# Patient Record
Sex: FEMALE | Race: ASIAN | NOT HISPANIC OR LATINO | URBAN - METROPOLITAN AREA
[De-identification: names, ages, dates, MRNs, and addresses within clinical notes are randomized per-mention and may not be internally consistent; named-entity substitution may affect disease eponyms.]

---

## 2017-07-11 ENCOUNTER — APPOINTMENT (RX ONLY)
Dept: URBAN - METROPOLITAN AREA CLINIC 55 | Facility: CLINIC | Age: 37
Setting detail: DERMATOLOGY
End: 2017-07-11

## 2017-07-11 VITALS — HEIGHT: 64 IN | WEIGHT: 120 LBS

## 2017-07-11 DIAGNOSIS — M31.0 HYPERSENSITIVITY ANGIITIS: ICD-10-CM

## 2017-07-11 PROCEDURE — ? DIAGNOSIS COMMENT

## 2017-07-11 PROCEDURE — ? BIOPSY BY PUNCH METHOD

## 2017-07-11 PROCEDURE — ? COUNSELING

## 2017-07-11 ASSESSMENT — LOCATION SIMPLE DESCRIPTION DERM: LOCATION SIMPLE: RIGHT THIGH

## 2017-07-11 ASSESSMENT — LOCATION DETAILED DESCRIPTION DERM: LOCATION DETAILED: RIGHT ANTERIOR DISTAL THIGH

## 2017-07-11 ASSESSMENT — LOCATION ZONE DERM: LOCATION ZONE: LEG

## 2017-07-11 NOTE — PROCEDURE: BIOPSY BY PUNCH METHOD
Hemostasis: None
Size Of Lesion In Cm (Optional): 0
Wound Care: Aquaphor
Punch Size In Mm: 3
Dressing: pressure dressing with telfa
Render Post-Care Instructions In Note?: yes
Path Notes (To The Dermatopathologist): [facility]
Bill 18126 For Specimen Handling/Conveyance To Laboratory?: no
Consent: Oral consent was obtained and risks were reviewed including but not limited to scarring, infection, bleeding, scabbing, incomplete removal, nerve damage and allergy to anesthesia.
Suture Removal: 7 days
Detail Level: Detailed
Epidermal Sutures: 4-0 Ethilon
Anesthesia Type: 1% lidocaine without epinephrine
Billing Type: Third-Party Bill
Biopsy Type: H and E
Lab: [id number]
Anesthesia Volume In Cc (Will Not Render If 0): 1.2
Notification Instructions: Patient will be notified of biopsy results. However, patient instructed to call the office if not contacted within 2 weeks.
Post-Care Instructions: I reviewed with the patient in detail post-care instructions. Patient is to keep the biopsy site dry overnight, and then apply aquaphor daily until healed.
Lab Facility: [id number]

## 2017-07-28 ENCOUNTER — APPOINTMENT (RX ONLY)
Dept: URBAN - METROPOLITAN AREA CLINIC 55 | Facility: CLINIC | Age: 37
Setting detail: DERMATOLOGY
End: 2017-07-28

## 2017-07-28 VITALS — WEIGHT: 120 LBS | HEIGHT: 64 IN

## 2017-07-28 DIAGNOSIS — M31.0 HYPERSENSITIVITY ANGIITIS: ICD-10-CM | Status: IMPROVED

## 2017-07-28 DIAGNOSIS — Z48.02 ENCOUNTER FOR REMOVAL OF SUTURES: ICD-10-CM

## 2017-07-28 PROCEDURE — ? OTHER

## 2017-07-28 PROCEDURE — 99024 POSTOP FOLLOW-UP VISIT: CPT

## 2017-07-28 PROCEDURE — ? SUTURE REMOVAL (GLOBAL PERIOD)

## 2017-07-28 PROCEDURE — ? DIAGNOSIS COMMENT

## 2017-07-28 PROCEDURE — ? COUNSELING

## 2017-07-28 ASSESSMENT — LOCATION ZONE DERM: LOCATION ZONE: LEG

## 2017-07-28 ASSESSMENT — LOCATION DETAILED DESCRIPTION DERM: LOCATION DETAILED: RIGHT KNEE

## 2017-07-28 ASSESSMENT — LOCATION SIMPLE DESCRIPTION DERM: LOCATION SIMPLE: RIGHT KNEE

## 2017-07-28 NOTE — PROCEDURE: DIAGNOSIS COMMENT
Detail Level: Simple
Comment: Patient is better today, her labs were reviewed and they have improved. Explained positive test for Hep A infection in depth and discussed different causes and treatment. Advised her that the skin lesions will slowly resolve.

## 2017-07-28 NOTE — PROCEDURE: SUTURE REMOVAL (GLOBAL PERIOD)
Add 60549 Cpt? (Important Note: In 2017 The Use Of 98346 Is Being Tracked By Cms To Determine Future Global Period Reimbursement For Global Periods): yes
Detail Level: Simple

## 2017-07-28 NOTE — PROCEDURE: OTHER
Note Text (......Xxx Chief Complaint.): This diagnosis correlates with the
Other (Free Text): No charge today. Patient is from the [facility].
Detail Level: Zone

## 2017-12-11 ENCOUNTER — APPOINTMENT (RX ONLY)
Dept: URBAN - METROPOLITAN AREA CLINIC 55 | Facility: CLINIC | Age: 37
Setting detail: DERMATOLOGY
End: 2017-12-11

## 2017-12-11 DIAGNOSIS — M31.0 HYPERSENSITIVITY ANGIITIS: ICD-10-CM | Status: STABLE

## 2017-12-11 PROCEDURE — ? COUNSELING

## 2017-12-11 PROCEDURE — ? TREATMENT REGIMEN

## 2017-12-11 PROCEDURE — ? OTHER

## 2017-12-11 ASSESSMENT — PAIN INTENSITY VAS: HOW INTENSE IS YOUR PAIN 0 BEING NO PAIN, 10 BEING THE MOST SEVERE PAIN POSSIBLE?: NO PAIN

## 2017-12-11 ASSESSMENT — LOCATION DETAILED DESCRIPTION DERM
LOCATION DETAILED: LEFT POPLITEAL SKIN
LOCATION DETAILED: RIGHT POPLITEAL SKIN

## 2017-12-11 ASSESSMENT — LOCATION SIMPLE DESCRIPTION DERM
LOCATION SIMPLE: RIGHT POPLITEAL SKIN
LOCATION SIMPLE: LEFT POPLITEAL SKIN

## 2017-12-11 ASSESSMENT — LOCATION ZONE DERM: LOCATION ZONE: LEG

## 2017-12-11 ASSESSMENT — SEVERITY ASSESSMENT: SEVERITY: ALMOST CLEAR

## 2017-12-11 NOTE — PROCEDURE: TREATMENT REGIMEN
Detail Level: Simple
Plan: Reviewed previous labs. Repeat SED, CBC and CMP. Last lab SED rate and Liver function test were elevated. Will have [facility] repeat labs.advised that the dye in medication can cause her rash.

## 2017-12-11 NOTE — PROCEDURE: OTHER
Other (Free Text): Patient reports recent upper respiratory infection with nausea.
Note Text (......Xxx Chief Complaint.): This diagnosis correlates with the
Detail Level: Zone
Other (Free Text): From [facility]